# Patient Record
Sex: MALE | Race: OTHER | HISPANIC OR LATINO | Employment: FULL TIME | URBAN - METROPOLITAN AREA
[De-identification: names, ages, dates, MRNs, and addresses within clinical notes are randomized per-mention and may not be internally consistent; named-entity substitution may affect disease eponyms.]

---

## 2019-02-01 ENCOUNTER — APPOINTMENT (OUTPATIENT)
Dept: LAB | Facility: HOSPITAL | Age: 64
End: 2019-02-01
Payer: COMMERCIAL

## 2019-02-01 ENCOUNTER — TRANSCRIBE ORDERS (OUTPATIENT)
Dept: ADMINISTRATIVE | Facility: HOSPITAL | Age: 64
End: 2019-02-01

## 2019-02-01 ENCOUNTER — OFFICE VISIT (OUTPATIENT)
Dept: FAMILY MEDICINE CLINIC | Facility: CLINIC | Age: 64
End: 2019-02-01
Payer: COMMERCIAL

## 2019-02-01 VITALS
BODY MASS INDEX: 22.63 KG/M2 | DIASTOLIC BLOOD PRESSURE: 70 MMHG | WEIGHT: 123 LBS | OXYGEN SATURATION: 98 % | HEIGHT: 62 IN | HEART RATE: 82 BPM | TEMPERATURE: 98.1 F | RESPIRATION RATE: 16 BRPM | SYSTOLIC BLOOD PRESSURE: 120 MMHG

## 2019-02-01 DIAGNOSIS — R00.2 PALPITATIONS: ICD-10-CM

## 2019-02-01 DIAGNOSIS — R73.9 HYPERGLYCEMIA: ICD-10-CM

## 2019-02-01 DIAGNOSIS — Z11.59 NEED FOR HEPATITIS C SCREENING TEST: ICD-10-CM

## 2019-02-01 DIAGNOSIS — Z00.01 ENCOUNTER FOR GENERAL ADULT MEDICAL EXAMINATION WITH ABNORMAL FINDINGS: ICD-10-CM

## 2019-02-01 DIAGNOSIS — Z12.11 COLON CANCER SCREENING: ICD-10-CM

## 2019-02-01 DIAGNOSIS — Z00.01 ENCOUNTER FOR GENERAL ADULT MEDICAL EXAMINATION WITH ABNORMAL FINDINGS: Primary | ICD-10-CM

## 2019-02-01 LAB
ALBUMIN SERPL BCP-MCNC: 4.6 G/DL (ref 3–5.2)
ALP SERPL-CCNC: 89 U/L (ref 43–122)
ALT SERPL W P-5'-P-CCNC: 40 U/L (ref 9–52)
ANION GAP SERPL CALCULATED.3IONS-SCNC: 8 MMOL/L (ref 5–14)
AST SERPL W P-5'-P-CCNC: 34 U/L (ref 17–59)
BASOPHILS # BLD AUTO: 0.1 THOUSANDS/ΜL (ref 0–0.1)
BASOPHILS NFR BLD AUTO: 1 % (ref 0–1)
BILIRUB SERPL-MCNC: 0.7 MG/DL
BUN SERPL-MCNC: 15 MG/DL (ref 5–25)
CALCIUM SERPL-MCNC: 9.8 MG/DL (ref 8.4–10.2)
CHLORIDE SERPL-SCNC: 100 MMOL/L (ref 97–108)
CHOLEST SERPL-MCNC: 225 MG/DL
CO2 SERPL-SCNC: 31 MMOL/L (ref 22–30)
CREAT SERPL-MCNC: 0.95 MG/DL (ref 0.7–1.5)
EOSINOPHIL # BLD AUTO: 0.3 THOUSAND/ΜL (ref 0–0.4)
EOSINOPHIL NFR BLD AUTO: 5 % (ref 0–6)
ERYTHROCYTE [DISTWIDTH] IN BLOOD BY AUTOMATED COUNT: 12.3 %
EST. AVERAGE GLUCOSE BLD GHB EST-MCNC: 105 MG/DL
GFR SERPL CREATININE-BSD FRML MDRD: 86 ML/MIN/1.73SQ M
GLUCOSE P FAST SERPL-MCNC: 92 MG/DL (ref 70–99)
HBA1C MFR BLD: 5.3 % (ref 4.2–6.3)
HCT VFR BLD AUTO: 47.2 % (ref 41–53)
HDLC SERPL-MCNC: 61 MG/DL (ref 40–59)
HGB BLD-MCNC: 16 G/DL (ref 13.5–17.5)
LDLC SERPL CALC-MCNC: 150 MG/DL
LYMPHOCYTES # BLD AUTO: 1.3 THOUSANDS/ΜL (ref 0.5–4)
LYMPHOCYTES NFR BLD AUTO: 24 % (ref 25–45)
MCH RBC QN AUTO: 30.8 PG (ref 26–34)
MCHC RBC AUTO-ENTMCNC: 33.8 G/DL (ref 31–36)
MCV RBC AUTO: 91 FL (ref 80–100)
MONOCYTES # BLD AUTO: 0.3 THOUSAND/ΜL (ref 0.2–0.9)
MONOCYTES NFR BLD AUTO: 6 % (ref 1–10)
NEUTROPHILS # BLD AUTO: 3.4 THOUSANDS/ΜL (ref 1.8–7.8)
NEUTS SEG NFR BLD AUTO: 63 % (ref 45–65)
NONHDLC SERPL-MCNC: 164 MG/DL
PLATELET # BLD AUTO: 172 THOUSANDS/UL (ref 150–450)
PLATELET BLD QL SMEAR: ADEQUATE
PMV BLD AUTO: 10.3 FL (ref 8.9–12.7)
POTASSIUM SERPL-SCNC: 4.5 MMOL/L (ref 3.6–5)
PROT SERPL-MCNC: 8.3 G/DL (ref 5.9–8.4)
RBC # BLD AUTO: 5.17 MILLION/UL (ref 4.5–5.9)
RBC MORPH BLD: NORMAL
SODIUM SERPL-SCNC: 139 MMOL/L (ref 137–147)
TRIGL SERPL-MCNC: 69 MG/DL
TSH SERPL DL<=0.05 MIU/L-ACNC: 0.75 UIU/ML (ref 0.47–4.68)
WBC # BLD AUTO: 5.4 THOUSAND/UL (ref 4.5–11)

## 2019-02-01 PROCEDURE — 99386 PREV VISIT NEW AGE 40-64: CPT | Performed by: FAMILY MEDICINE

## 2019-02-01 PROCEDURE — 85025 COMPLETE CBC W/AUTO DIFF WBC: CPT

## 2019-02-01 PROCEDURE — 80061 LIPID PANEL: CPT

## 2019-02-01 PROCEDURE — 86803 HEPATITIS C AB TEST: CPT

## 2019-02-01 PROCEDURE — 80053 COMPREHEN METABOLIC PANEL: CPT

## 2019-02-01 PROCEDURE — 83036 HEMOGLOBIN GLYCOSYLATED A1C: CPT

## 2019-02-01 PROCEDURE — 84443 ASSAY THYROID STIM HORMONE: CPT

## 2019-02-01 PROCEDURE — 36415 COLL VENOUS BLD VENIPUNCTURE: CPT
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
## 2019-02-01 NOTE — PATIENT INSTRUCTIONS
Por favor stone con detenimiento  Muy Importante!!!    · Mobley peso  será revisado  Es posible que [employer] midan mobley altura para calcular mobley índice de masa corporal [facility])  El [address] indica si tiene un peso saludable  · Se verificarán mobley presión arterial  y frecuencia cardíaca  También pueden revisar mobley temperatura  · Exámenes de [facility] y [facility]  se podría realizar  Se podrían realizar exámenes de eugenia para revisar los niveles de [facility]  Los niveles anormales de colesterol aumentan el riesgo de enfermedad del corazón y accidente cerebrovascular  Puede que también necesite bhumika prueba de eugenia u orina para revisar si tiene diabetes si usted está en mayor riesgo  Las pruebas de orina pueden hacerse para buscar signos de bhumika infección o bhumika enfermedad renal      · Un examen físico  incluye la comprobación de calvin latidos del corazón y los pulmones con un estetoscopio  Mobley médico también podría revisarle la piel para buscar daños causados por el sol  · Pruebas de detección  podría recomendarse  Se realiza un examen de detección para detectar enfermedades que pueden no causar síntomas  Los exámenes de detección que necesite dependen de mobley edad, género, antecedentes familiares y hábitos de flako  Por ejemplo, podrían recomendarle la exploración selectiva colorrectal si tiene 48 años o más  ¿Qué exámenes de detección necesito si soy bhumika hank? · El examen de Papanicolaou  se utiliza para detectar cáncer de justino uterino  El examen del Papanicolaou por lo general se realiza entre 3 a 5 años dependiendo de mobley edad  Puede necesitarlo más a menudo si usted ha tenido [employer] de la prueba de Papanicolaou en el pasado  Pregunte a mobley médico con qué frecuencia debería realizarse un examen de Papanicolaou  · Bhumika mamografía  es bhumika radiografía de calvin senos para detectar cáncer de mama  Los expertos recomiendan [address] cada 2 años empezando a los 48 años de [place]   Es probable que usted necesite bhumika mamografía a los 52 años o antes si tiene riesgo alto de cáncer de seno  Hable con mobley médico sobre cuándo debe empezar con calvin mamografías y con cuánta frecuencia las necesita  ¿Qué vacunas podría necesitar? · Debe recibir Kinnie [place]ston vacuna contra la influenza  todos los [place]  La vacuna contra la influenza protege de la gripe  Varios tipos de virus causan la influenza  Debido a que los virus [place] con el [place], es necesaria la producción de nuevas vacunas cada año  · Debe recibir Kinnie Cranston vacuna de refuerzo contra el tétanos-difteria (Td)  cada 10 años  Esta vacuna protege contra el tétanos y la difteria  El tétanos es bhumika infección severa que puede causar trismo y espasmos musculares dolorosos  La difteria es bhumika infección bacterial grave que produce bhumika cubierta gruesa en la parte de atrás de la boca y garganta  · Debe recibir la vacuna contra el virus del papiloma humano (VPH)  si usted es hank y [place] 19 y 32 años o es hombre y [place] 23 y 24 años y nunca la recibió  Esta vacuna protege contra la infección por VPH  El virus del papiloma humano o VPH es la infección más común que se transmite por contacto sexual  El virus del papiloma humano también podría provocar cáncer vaginal, del pene y del ano  · Debe recibir la vacuna antineumocócica  si tiene más de 72 años  La vacuna antineumocócica es bhumika inyección que se administra para protegerlo contra bhumika enfermedad neumocócica  La enfermedad neumocócica es bhumika infección causada por la bacteria neumocócica  La infección puede causar neumonía, meningitis o bhumika infección del oído  · Debe recibir la vacuna contra la culebrilla  si tiene [address] [place] [address],[address] [place] [address] o [place], incluso si servin tenido culebrilla antes  La vacuna contra la culebrilla (herpes zóster) es bhumika inyección usada para proteger contra el virus zóster que causa la varicela  [place]ia es el mismo virus que causa la varicela   La culebrilla es un sarpullido doloroso que se desarrolla en personas que tuvieron varicela o servin estado expuestas al virus  ¿Cómo puedo comer de manera saludable? [place] es un modelo para planear comidas sanas  Muestra los tipos y cantidades de alimentos que deberían ir en un plato  Yuki Sidles y verduras representan alrededor de la mitad de mobley plato y los granos y proteínas representan la otra mitad  Se incluye bhumika porción de productos lácteos al lado del plato  La cantidad de calorías y [address] de las porciones que usted necesita dependen de mobley edad, [place], peso y altura  Los ejemplos de alimentos saludables son:  · Consuma bhumika variedad de verduras  roberta las de color glendy oscuro, valenzuela y [place]  Usted también puede incluir verduras enlatadas bajas en sodio (sal) y verduras congeladas sin mantequilla ni salsas [id number]  · Consuma bhumika variedad de fruta frescas , las frutas enlatadas en 100% de jugo , fruta [place] y noy secos  · Incluya granos enteros  Por lo menos la mitad de los granos que usted consume deben ser granos de jena integral  Por ejemplo, panes de grano entero, pasta integral, arroz integral y cereales de grano entero roberta la benoit  · Consuma bhumika variedad de alimentos con proteínas roberta mariscos (pescado y crustáceos), Néstor Simple y carne de ave sin piel (pavo y riley)  Ejemplos de kaycee magras incluyen pierna, paleta o lomo de puerco y corine, solomillo o, lomo de res y carne [place] de res extra [place]  Otros alimentos ricos en proteínas son los huevos y sustitutos de [place], frijoles, chícharos, productos de soya, nueces y semillas  · Elija productos lácteos bajos en grasa [employer] o del 1% o yogur, queso y requesón bajos en grasa  · Limite las grasas poco saludables,  roberta la [place], la margarina dura y la [place]  ¿Qué cantidad de actividad física necesito? Realice bhumika actividad física por lo menos 30 minutos al día, la mayoría de los días de la [place]   Algunos de los ejercicios incluyen caminar, montar en bicicleta, bailar y nadar  Usted también puede realizar más actividad física usando las escaleras en vez de los ascensores o estacionarse más lejos cuando Lon Golder a las tiendas  Incluya ejercicios para fortalecer los músculos 2 días a la semana  El ejercicio regular proporciona muchos beneficios para la deepak  Barnie Sow a controlar mobley peso y [employer] riesgo de diabetes tipo 2, presión arterial daria, enfermedad del corazón y accidente cerebrovascular  El ejercicio [employer] puede ayudar a mejorar mobley estado de ánimo  Pregunte a mobley médico acerca del mejor plan de ejercicio para usted  ¿Cuáles son Elmer Exon generales de deepak y seguridad que ximena seguir? · No fume  La nicotina y otras sustancias químicas que contienen los cigarrillos y cigarros pueden dañar los pulmones  Pida información a mobley médico si usted actualmente fuma y necesita ayuda para dejar de fumar  Los cigarrillos electrónicos o tabaco sin humo todavía contienen nicotina  Consulte con mobley médico antes de [employer]  · Limite el consumo de alcohol  Un trago equivale a 12 onzas de cerveza, 5 onzas de vino o 1 onza y ½ de licor  · Baje de peso, si es necesario  El sobrepeso aumenta el riesgo de ciertas condiciones de [place]  Estos incluyen enfermedad del corazón, presión arterial daria, diabetes tipo 2 y ciertos tipos de cáncer  · Proteja mobley piel  No tome el sol ni use terrie de bronceado  Use protector solar con un SPF 13 o mayor  Aplíquese el bloqueador por lo menos 15 minutos antes de que vaya a estar al [employer]  Vuelva a aplicarse la crema solar cada 2 horas  Use ropa protectora, sombrero y lentes para el sol cuando se encuentre afuera  · Conduzca con seguridad  Use siempre mobley cinturón de seguridad  Asegúrese que todos en el lexi usan el cinturón de seguridad  Un cinturón de seguridad puede salvar mobley flako en meka de un accidente automovilístico  No use el celular cuando esté manejando   [place] puede hacer que se distraiga y causar un accidente  Es mejor que pare y se orille si necesita hacer bhumika Verle Bunde un texto  · Practique el sexo seguro  Use condones de látex si es sexualmente [place] y tiene más de [place] and [place]  [place] médico puede recomendar exámenes de detección de infecciones de transmisión sexual (ITS)  · Use un pierre, un chaleco salvavidas y unos implementos de protección  Siempre use un pierre al [employer] en bicicleta o motocicleta, esquiar o jugar deportes que podrían causar bhumika lesión en la marli  Use implementos de protección cuando practique deportes  Use un chaleco salvavidas cuando esté en un bote o practicando actividades acuáticas

## 2019-02-02 LAB — HCV AB SER QL: NORMAL

## 2019-02-04 PROCEDURE — 93000 ELECTROCARDIOGRAM COMPLETE: CPT | Performed by: FAMILY MEDICINE

## 2020-06-10 ENCOUNTER — TELEPHONE (OUTPATIENT)
Dept: FAMILY MEDICINE CLINIC | Facility: CLINIC | Age: 65
End: 2020-06-10

## 2020-06-10 NOTE — TELEPHONE ENCOUNTER
Spoke with patient who states he now has medicaid access from the state, explained that we do not participate with his plan, he states he is going to look for provider that does  Please remove pcp field

## 2020-09-22 NOTE — TELEPHONE ENCOUNTER
09/22/20 9:36 AM     Thank you for your request  Your request has been received, reviewed, and the patient chart updated  The PCP has successfully been removed with a patient attribution note  This message will now be completed      Thank you  Ryan Garcia